# Patient Record
Sex: FEMALE | Race: WHITE | NOT HISPANIC OR LATINO | Employment: FULL TIME | URBAN - METROPOLITAN AREA
[De-identification: names, ages, dates, MRNs, and addresses within clinical notes are randomized per-mention and may not be internally consistent; named-entity substitution may affect disease eponyms.]

---

## 2018-07-27 ENCOUNTER — OFFICE VISIT (OUTPATIENT)
Dept: URGENT CARE | Facility: MEDICAL CENTER | Age: 18
End: 2018-07-27
Payer: COMMERCIAL

## 2018-07-27 VITALS
RESPIRATION RATE: 20 BRPM | WEIGHT: 101.13 LBS | SYSTOLIC BLOOD PRESSURE: 127 MMHG | TEMPERATURE: 98.6 F | HEART RATE: 97 BPM | HEIGHT: 64 IN | OXYGEN SATURATION: 100 % | BODY MASS INDEX: 17.26 KG/M2 | DIASTOLIC BLOOD PRESSURE: 83 MMHG

## 2018-07-27 DIAGNOSIS — T78.3XXA ANGIOEDEMA, INITIAL ENCOUNTER: Primary | ICD-10-CM

## 2018-07-27 PROCEDURE — 99283 EMERGENCY DEPT VISIT LOW MDM: CPT | Performed by: FAMILY MEDICINE

## 2018-07-27 PROCEDURE — G0382 LEV 3 HOSP TYPE B ED VISIT: HCPCS | Performed by: FAMILY MEDICINE

## 2018-07-27 RX ORDER — SACCHAROMYCES BOULARDII 250 MG
250 CAPSULE ORAL 2 TIMES DAILY
COMMUNITY

## 2018-07-27 RX ORDER — PREDNISONE 10 MG/1
TABLET ORAL
Qty: 21 TABLET | Refills: 0 | Status: SHIPPED | OUTPATIENT
Start: 2018-07-27

## 2018-07-27 NOTE — PROGRESS NOTES
[facility]        NAME: Angie Blevins is a 21 y o  female  : 2000    MRN: [id number]  DATE: 2018  TIME: 1:39 PM    Assessment and Plan   Angioedema, initial encounter [T78  3XXA]  1  Angioedema, initial encounter  predniSONE 10 mg tablet         Patient Instructions     Take prednisone taper as prescribed  Take with food  Continue benadryl every 4-6 days  Watch for any swelling of the tongue, throat, shortness of breath, difficulty breathing, abdominal pain, nausea, vomiting, diarrhea, dizziness and go to the ER for this  Follow up with your PCP in 2-3 days  Proceed to  ER if symptoms worsen  Chief Complaint     Chief Complaint   Patient presents with    Edema     x 6 days , took clartin, bendadryl         History of Present Illness       This is a 21year old female presenting for lip swelling x 6 days  She reports that both of her lips started swelling 6 days ago  She cannot think of any new products or foods but did get a new water bottle that is metal and thinks she may be allergic to this  She tried Claritin, Allegra, and Benadryl which did not help  No throat swelling, tongue swelling, dizziness, lightheadedness, nausea, vomiting, abdominal pain, diarrhea, fevers, shortness of breath, wheezing  Review of Systems   Review of Systems   Constitutional: Negative for fever  HENT: Negative for trouble swallowing and voice change  Respiratory: Negative for cough and shortness of breath  Gastrointestinal: Negative for abdominal pain, diarrhea, nausea and vomiting  Skin: Negative for rash  Neurological: Negative for dizziness and light-headedness           Current Medications       Current Outpatient Prescriptions:     saccharomyces boulardii (FLORASTOR) 250 mg capsule, Take 250 mg by mouth 2 (two) times a day, Disp: , Rfl:     predniSONE 10 mg tablet, Take 60 mg x 1 day, 50 mg x 1 day, 40 mg x 1 day, 30 mg x 1 day, 20 mg x 1 day, 10 mg x 1 day, Disp: 21 tablet, Rfl: 0    Current Allergies     Allergies as of 07/27/2018 - Reviewed 07/27/2018   Allergen Reaction Noted    Penicillins  07/27/2018            The following portions of the patient's history were reviewed and updated as appropriate: allergies, current medications, past family history, past medical history, past social history, past surgical history and problem list      History reviewed  No pertinent past medical history  History reviewed  No pertinent surgical history  No family history on file  Medications have been verified  Objective   /83   Pulse 97   Temp 98 6 °F (37 °C) (Temporal)   Resp 20   Ht 5' 4" (1 626 m)   Wt 45 9 kg (101 lb 2 oz)   SpO2 100%   BMI 17 36 kg/m²        Physical Exam     Physical Exam   Constitutional: She appears well-developed and well-nourished  No distress  HENT:   Head: Normocephalic and atraumatic  Right Ear: External ear normal    Left Ear: External ear normal    Nose: Nose normal    Mouth/Throat: Oropharynx is clear and moist    There is mild swelling to the lips  No swelling to the tongue or posterior pharynx  Eyes: Conjunctivae and EOM are normal  Pupils are equal, round, and reactive to light  Neck: Normal range of motion  Neck supple  Cardiovascular: Normal rate, regular rhythm and normal heart sounds  Pulmonary/Chest: Effort normal and breath sounds normal  No stridor  No respiratory distress  She has no wheezes  She has no rales  Neurological: She is alert  Skin: Skin is warm and dry  She is not diaphoretic  Nursing note and vitals reviewed

## 2018-07-27 NOTE — PATIENT INSTRUCTIONS
Take prednisone taper as prescribed  Take with food  Continue benadryl every 4-6 days  Watch for any swelling of the tongue, throat, shortness of breath, difficulty breathing, abdominal pain, nausea, vomiting, diarrhea, dizziness and go to the ER for this  Follow up with your PCP in 2-3 days